# Patient Record
Sex: MALE | Race: BLACK OR AFRICAN AMERICAN | URBAN - METROPOLITAN AREA
[De-identification: names, ages, dates, MRNs, and addresses within clinical notes are randomized per-mention and may not be internally consistent; named-entity substitution may affect disease eponyms.]

---

## 2021-03-10 ENCOUNTER — OFFICE VISIT (OUTPATIENT)
Dept: PODIATRY | Age: 46
End: 2021-03-10

## 2021-03-10 VITALS
WEIGHT: 145 LBS | TEMPERATURE: 97.9 F | BODY MASS INDEX: 20.76 KG/M2 | HEIGHT: 70 IN | HEART RATE: 115 BPM | SYSTOLIC BLOOD PRESSURE: 123 MMHG | DIASTOLIC BLOOD PRESSURE: 73 MMHG

## 2021-03-10 DIAGNOSIS — M20.11 VALGUS DEFORMITY OF BOTH GREAT TOES: ICD-10-CM

## 2021-03-10 DIAGNOSIS — M20.12 VALGUS DEFORMITY OF BOTH GREAT TOES: ICD-10-CM

## 2021-03-10 DIAGNOSIS — M79.672 FOOT PAIN, BILATERAL: Primary | ICD-10-CM

## 2021-03-10 DIAGNOSIS — M79.671 FOOT PAIN, BILATERAL: Primary | ICD-10-CM

## 2021-03-10 PROCEDURE — 99202 OFFICE O/P NEW SF 15 MIN: CPT | Performed by: PODIATRIST

## 2021-03-10 PROCEDURE — 99203 OFFICE O/P NEW LOW 30 MIN: CPT | Performed by: PODIATRIST

## 2021-03-10 RX ORDER — POTASSIUM CHLORIDE 1500 MG/1
TABLET, FILM COATED, EXTENDED RELEASE ORAL
COMMUNITY
Start: 2021-01-28

## 2021-03-10 RX ORDER — ONDANSETRON 8 MG/1
TABLET, ORALLY DISINTEGRATING ORAL
COMMUNITY

## 2021-03-10 RX ORDER — FLUTICASONE PROPIONATE 50 MCG
SPRAY, SUSPENSION (ML) NASAL
COMMUNITY
Start: 2021-01-28

## 2021-03-10 RX ORDER — NAPROXEN SODIUM 220 MG
TABLET ORAL
COMMUNITY

## 2021-03-10 RX ORDER — CYCLOBENZAPRINE HCL 5 MG
TABLET ORAL
COMMUNITY

## 2021-03-10 SDOH — HEALTH STABILITY: MENTAL HEALTH: HOW OFTEN DO YOU HAVE A DRINK CONTAINING ALCOHOL?: MONTHLY OR LESS

## 2021-03-10 NOTE — PROGRESS NOTES
[address], [facility] Scot Bridges  Tel: [phone]   Fax: [phone]    Subjective     CC: foot pain, b/l  HPI:  Ho Gu is a 55y.o. year old male who presents to clinic today with aching bilateral first MPJ pain present over 1 year increasingly painful this last month. It has been exceedingly painful which he rates a 10 out of 10 while working and standing on his feet all day. He has difficulty categorizing the pain. Changing shoe types has been beneficial when he takes off his work boots and switches to an athletic shoe but on most days after only an hour or 2 of weightbearing activity the pain reaches the highest levels. He has not iced the areas or attempted any other mitigation. He denies other pedal complaints. He is a nontobacco user. Primary care physician is No primary care provider on file. .    ROS:    Constitutional: Denies nausea, vomiting, fever, chills. Neurologic: Denies numbness, tingling, and burning in the feet. Vascular: Denies symptoms of lower extremity claudication. Skin: Denies open wounds. Otherwise negative except as noted in the HPI. PMH:  No past medical history on file. Surgical History:   No past surgical history on file. Social History:  Social History     Tobacco Use    Smoking status: Heavy Tobacco Smoker     Packs/day: 1.00    Smokeless tobacco: Never Used   Substance Use Topics    Alcohol use: Yes     Frequency: Monthly or less    Drug use: Yes     Types: Marijuana       Medications:  Prior to Admission medications    Medication Sig Start Date End Date Taking? Authorizing Provider   cyclobenzaprine (FLEXERIL) 5 MG tablet cyclobenzaprine 5 mg tablet   Yes Historical Provider, MD   fluticasone (FLONASE) 50 MCG/ACT nasal spray  1/28/21  Yes Historical Provider, MD   naproxen sodium (ALEVE) 220 MG tablet Aleve 220 mg tablet   Take 1 tablet every 12 hours by oral route as needed.    Yes Historical Provider, MD   ondansetron (ZOFRAN-ODT) 8 MG TBDP disintegrating tablet ondansetron 8 mg disintegrating tablet   Place 1 tablet twice a day by translingual route as needed for 3 days. Yes Historical Provider, MD   potassium chloride (KLOR-CON M) 20 MEQ TBCR extended release tablet  1/28/21  Yes Historical Provider, MD       Objective     Vitals:    03/10/21 1500   BP: 123/73   Pulse: 115   Temp:        No results found for: LABA1C    Physical Exam:  General:  Alert and oriented x3. In no acute distress. Lower Extremity Physical Exam:    Vascular: DP and PT pulses are palpable, Bilateral. CFT <3 seconds to all digits, Bilateral.  Neg edema, Bilateral. Hair growth is absent to the level of the digits, Bilateral.     Neuro: Saph/sural/SP/DP/plantar sensation present to light touch. Protective sensation is intact to 10/10 sites as tested with a 5.07g SWMF, Bilateral.     Musculoskeletal: EHL/FHL/GS/TA gross motor intact. Tenderness to palpation of b/l plantar 1st MPJs L>R, sesamoids. B/l HAV, reducible w/o ROM pain or limitation. Gross deformity is absent, Bilateral.     Dermatologic: No open lesions, Bilateral.  Interdigital maceration absent, Bilateral.  Nails 1-10 are WNL. Biomechanical Exam:      Right foot: 1st MTPJ: Loaded: 40 Unloaded: 65  Right foot: 1st Ray: 2 mm in dorsal and plantar direction from neutral  Right foot: Ankle DF knee extended: 10  Right foot: Ankle DF knee flexed: 15    Left foot: 1st MTPJ: Loaded: 40 Unloaded: 65  Left foot: 1st Ray: 2 mm in dorsal and plantar direction from neutral  Left foot: Ankle DF knee extended: 10  Left foot: Ankle DF knee flexed: 15    Gait Analysis: antalgic, right    Imaging: ordered    Assessment   Edgar Silveira is a 55 y.o. male with     Diagnosis Orders   1.  Foot pain, bilateral  XR FOOT RIGHT (MIN 3 VIEWS)    XR FOOT LEFT (MIN 3 VIEWS)   2. Valgus deformity of both great toes  XR FOOT RIGHT (MIN 3 VIEWS)    XR FOOT LEFT (MIN 3 VIEWS)        Plan · Patient examined and evaluated  · Diagnosis and treatment options discussed in detail  · Ordered x-rays  for both right and left foot 3 views to assess for any osseous abnormality specifically stress fractures, sesamoid fractures, symptomatic bipartite sesamoids  · Educated the patient on the importance of adequate shoe gear and utilizing a over-the-counter orthotic to determine if moving forward with or permanent orthotics will be beneficial for him; discussed the nature of his relatively high arch and limited first ray motion and the biomechanics associated with the pain at the first MPJs  · Patient to RTC in 1 month - assess radiographs at that time  · Please, call the office with any questions or concerns  · The patient reviewed the case with Dr. Marcelle Joyner who agreed with the plan  ·   No orders of the defined types were placed in this encounter.     Orders Placed This Encounter   Procedures    XR FOOT RIGHT (MIN 3 VIEWS)     Standing Status:   Future     Standing Expiration Date:   3/10/2022     Scheduling Instructions:      Weight bearing     Order Specific Question:   Reason for exam:     Answer:   pain 1st MPJ, sesamoids    XR FOOT LEFT (MIN 3 VIEWS)     Standing Status:   Future     Standing Expiration Date:   3/10/2022     Scheduling Instructions:      Weight bearing     Order Specific Question:   Reason for exam:     Answer:   1st MPJ pain, sesamoid pain       Damir Nelson DPM   Podiatric Medicine & Surgery   3/10/2021 at 4:32 PM